# Patient Record
(demographics unavailable — no encounter records)

---

## 2024-11-06 NOTE — REASON FOR VISIT
[FreeTextEntry1] : Change in bowel habits to small caliber stool.  Family history of colon cancer. history of breast cancer

## 2024-11-06 NOTE — ASSESSMENT
[FreeTextEntry1] : Continue high-fiber diet Repeat colonoscopy in 5 years   I spent 21 minutes with the patient and his all of her questions.

## 2024-11-06 NOTE — CONSULT LETTER
[Dear  ___] : Dear  [unfilled], [Consult Letter:] : I had the pleasure of evaluating your patient, [unfilled]. [( Thank you for referring [unfilled] for consultation for _____ )] : Thank you for referring [unfilled] for consultation for [unfilled] [Please see my note below.] : Please see my note below. [Consult Closing:] : Thank you very much for allowing me to participate in the care of this patient.  If you have any questions, please do not hesitate to contact me. [Sincerely,] : Sincerely, [FreeTextEntry3] : Mike Trevizo MD  [facility]enter[facility]

## 2024-11-06 NOTE — HISTORY OF PRESENT ILLNESS
[FreeTextEntry1] : She is a 43-year-old female who recently had a change in bowel habits to small caliber stools and has a family history of colon cancer specifically her grandfather. She also has a history of breast cancer.  Her recent colonoscopy was normal throughout.  She was recommended  a high-fiber diet and Metamucil, which she didn't take, with .markedly improved bowel habits.  She is feeling well and has no complaint